# Patient Record
Sex: MALE | Race: WHITE | NOT HISPANIC OR LATINO | Employment: FULL TIME | URBAN - METROPOLITAN AREA
[De-identification: names, ages, dates, MRNs, and addresses within clinical notes are randomized per-mention and may not be internally consistent; named-entity substitution may affect disease eponyms.]

---

## 2021-11-13 ENCOUNTER — HOSPITAL ENCOUNTER (EMERGENCY)
Facility: HOSPITAL | Age: 36
Discharge: HOME | End: 2021-11-13
Attending: SURGERY
Payer: COMMERCIAL

## 2021-11-13 VITALS
DIASTOLIC BLOOD PRESSURE: 91 MMHG | SYSTOLIC BLOOD PRESSURE: 139 MMHG | RESPIRATION RATE: 18 BRPM | HEART RATE: 72 BPM | TEMPERATURE: 98 F | OXYGEN SATURATION: 98 %

## 2021-11-13 DIAGNOSIS — M10.9 GOUT: ICD-10-CM

## 2021-11-13 DIAGNOSIS — M25.572 LEFT ANKLE PAIN: ICD-10-CM

## 2021-11-13 LAB
ALBUMIN SERPL BCP-MCNC: 4.2 G/DL (ref 3.5–5.2)
ALP SERPL-CCNC: 68 U/L (ref 55–135)
ALT SERPL W/O P-5'-P-CCNC: 30 U/L (ref 10–44)
ANION GAP SERPL CALC-SCNC: 12 MMOL/L (ref 8–16)
AST SERPL-CCNC: 19 U/L (ref 10–40)
BASOPHILS # BLD AUTO: 0.03 K/UL (ref 0–0.2)
BASOPHILS NFR BLD: 0.3 % (ref 0–1.9)
BILIRUB SERPL-MCNC: 2.1 MG/DL (ref 0.1–1)
BUN SERPL-MCNC: 15 MG/DL (ref 6–20)
CALCIUM SERPL-MCNC: 10 MG/DL (ref 8.7–10.5)
CHLORIDE SERPL-SCNC: 101 MMOL/L (ref 95–110)
CO2 SERPL-SCNC: 28 MMOL/L (ref 23–29)
CREAT SERPL-MCNC: 0.9 MG/DL (ref 0.5–1.4)
DIFFERENTIAL METHOD: ABNORMAL
EOSINOPHIL # BLD AUTO: 0.2 K/UL (ref 0–0.5)
EOSINOPHIL NFR BLD: 2.4 % (ref 0–8)
ERYTHROCYTE [DISTWIDTH] IN BLOOD BY AUTOMATED COUNT: 11.9 % (ref 11.5–14.5)
EST. GFR  (AFRICAN AMERICAN): >60 ML/MIN/1.73 M^2
EST. GFR  (NON AFRICAN AMERICAN): >60 ML/MIN/1.73 M^2
GLUCOSE SERPL-MCNC: 90 MG/DL (ref 70–110)
HCT VFR BLD AUTO: 45.5 % (ref 40–54)
HGB BLD-MCNC: 15.7 G/DL (ref 14–18)
IMM GRANULOCYTES # BLD AUTO: 0.06 K/UL (ref 0–0.04)
IMM GRANULOCYTES NFR BLD AUTO: 0.6 % (ref 0–0.5)
LYMPHOCYTES # BLD AUTO: 2.3 K/UL (ref 1–4.8)
LYMPHOCYTES NFR BLD: 24.7 % (ref 18–48)
MCH RBC QN AUTO: 29.9 PG (ref 27–31)
MCHC RBC AUTO-ENTMCNC: 34.5 G/DL (ref 32–36)
MCV RBC AUTO: 87 FL (ref 82–98)
MONOCYTES # BLD AUTO: 1 K/UL (ref 0.3–1)
MONOCYTES NFR BLD: 11.1 % (ref 4–15)
NEUTROPHILS # BLD AUTO: 5.7 K/UL (ref 1.8–7.7)
NEUTROPHILS NFR BLD: 60.9 % (ref 38–73)
NRBC BLD-RTO: 0 /100 WBC
PLATELET # BLD AUTO: 291 K/UL (ref 150–450)
PMV BLD AUTO: 10 FL (ref 9.2–12.9)
POTASSIUM SERPL-SCNC: 4.3 MMOL/L (ref 3.5–5.1)
PROT SERPL-MCNC: 7.8 G/DL (ref 6–8.4)
RBC # BLD AUTO: 5.25 M/UL (ref 4.6–6.2)
SODIUM SERPL-SCNC: 141 MMOL/L (ref 136–145)
URATE SERPL-MCNC: 8.8 MG/DL (ref 3.4–7)
WBC # BLD AUTO: 9.4 K/UL (ref 3.9–12.7)

## 2021-11-13 PROCEDURE — 84550 ASSAY OF BLOOD/URIC ACID: CPT | Performed by: SURGERY

## 2021-11-13 PROCEDURE — 63600175 PHARM REV CODE 636 W HCPCS: Performed by: SURGERY

## 2021-11-13 PROCEDURE — 96372 THER/PROPH/DIAG INJ SC/IM: CPT

## 2021-11-13 PROCEDURE — 99284 EMERGENCY DEPT VISIT MOD MDM: CPT | Mod: 25

## 2021-11-13 PROCEDURE — 80053 COMPREHEN METABOLIC PANEL: CPT | Performed by: SURGERY

## 2021-11-13 PROCEDURE — 85025 COMPLETE CBC W/AUTO DIFF WBC: CPT | Performed by: SURGERY

## 2021-11-13 PROCEDURE — 36415 COLL VENOUS BLD VENIPUNCTURE: CPT | Performed by: SURGERY

## 2021-11-13 RX ORDER — KETOROLAC TROMETHAMINE 30 MG/ML
60 INJECTION, SOLUTION INTRAMUSCULAR; INTRAVENOUS
Status: COMPLETED | OUTPATIENT
Start: 2021-11-13 | End: 2021-11-13

## 2021-11-13 RX ORDER — AZILSARTAN KAMEDOXOMIL AND CHLORTHALIDONE 40; 12.5 MG/1; MG/1
0.5 TABLET ORAL DAILY
COMMUNITY
Start: 2021-11-03

## 2021-11-13 RX ORDER — INDOMETHACIN 50 MG/1
50 CAPSULE ORAL
Qty: 15 CAPSULE | Refills: 0 | Status: SHIPPED | OUTPATIENT
Start: 2021-11-13

## 2021-11-13 RX ORDER — TESTOSTERONE CYPIONATE 200 MG/ML
100 INJECTION, SOLUTION INTRAMUSCULAR WEEKLY
COMMUNITY
Start: 2021-11-10

## 2021-11-13 RX ORDER — METHYLPREDNISOLONE 4 MG/1
TABLET ORAL
Qty: 1 EACH | Refills: 0 | Status: SHIPPED | OUTPATIENT
Start: 2021-11-13

## 2021-11-13 RX ORDER — METHYLPREDNISOLONE SOD SUCC 125 MG
125 VIAL (EA) INJECTION
Status: COMPLETED | OUTPATIENT
Start: 2021-11-13 | End: 2021-11-13

## 2021-11-13 RX ORDER — COLCHICINE 0.6 MG/1
0.6 CAPSULE ORAL
COMMUNITY

## 2021-11-13 RX ADMIN — KETOROLAC TROMETHAMINE 60 MG: 30 INJECTION, SOLUTION INTRAMUSCULAR at 04:11

## 2021-11-13 RX ADMIN — METHYLPREDNISOLONE SODIUM SUCCINATE 125 MG: 125 INJECTION, POWDER, FOR SOLUTION INTRAMUSCULAR; INTRAVENOUS at 04:11

## 2023-01-05 ENCOUNTER — HOSPITAL ENCOUNTER (EMERGENCY)
Facility: HOSPITAL | Age: 38
Discharge: HOME | End: 2023-01-05
Attending: SURGERY
Payer: COMMERCIAL

## 2023-01-05 VITALS
OXYGEN SATURATION: 97 % | TEMPERATURE: 98 F | RESPIRATION RATE: 18 BRPM | HEART RATE: 71 BPM | WEIGHT: 278.75 LBS | SYSTOLIC BLOOD PRESSURE: 134 MMHG | DIASTOLIC BLOOD PRESSURE: 84 MMHG

## 2023-01-05 DIAGNOSIS — K64.9 HEMORRHOIDS, UNSPECIFIED HEMORRHOID TYPE: Primary | ICD-10-CM

## 2023-01-05 PROCEDURE — 99282 EMERGENCY DEPT VISIT SF MDM: CPT

## 2023-01-05 RX ORDER — DOCUSATE SODIUM 100 MG/1
100 CAPSULE, LIQUID FILLED ORAL 2 TIMES DAILY PRN
Qty: 30 CAPSULE | Refills: 0 | Status: SHIPPED | OUTPATIENT
Start: 2023-01-05

## 2023-01-05 RX ORDER — HYDROCORTISONE 25 MG/G
CREAM TOPICAL 2 TIMES DAILY
Qty: 30 G | Refills: 2 | Status: SHIPPED | OUTPATIENT
Start: 2023-01-05

## 2023-01-05 NOTE — ED PROVIDER NOTES
Encounter Date: 1/5/2023       History     Chief Complaint   Patient presents with    Rectal Bleeding     Pt c/c of bleeding hemorrhoids after having a BM this morning.  Pt denies pain, reports having trouble controlling the bleeding at home.       37-year-old male presents with bleeding from the rectal area this morning  Patient on exam has a bleeding external hemorrhoid, was thrombosed  Apparently opened up with expulsion of the thrombus clots this morning  Patient does have a history of hemorrhoids, was straining this morning  Mild bleeding with no active signs of arterial brisk bleeding on evaluation    Review of patient's allergies indicates:   Allergen Reactions    Pcn [penicillins] Swelling    Lactose Diarrhea     Past Medical History:   Diagnosis Date    Hypertension      Past Surgical History:   Procedure Laterality Date    APPENDECTOMY      KNEE ARTHROSCOPY      TONSILLECTOMY       No family history on file.  Social History     Tobacco Use    Smoking status: Never    Smokeless tobacco: Never   Substance Use Topics    Alcohol use: Yes     Comment: occassional     Review of Systems   Gastrointestinal:  Positive for anal bleeding.     Physical Exam     Initial Vitals [01/05/23 0257]   BP Pulse Resp Temp SpO2   134/84 71 18 97.8 °F (36.6 °C) 97 %      MAP       --         Physical Exam    Nursing note and vitals reviewed.  Constitutional: Vital signs are normal. He appears well-developed and well-nourished. He is cooperative.   HENT:   Head: Normocephalic and atraumatic.   Right Ear: Hearing, tympanic membrane, external ear and ear canal normal.   Left Ear: Hearing, tympanic membrane, external ear and ear canal normal.   Nose: Nose normal.   Mouth/Throat: Uvula is midline, oropharynx is clear and moist and mucous membranes are normal.   Eyes: Conjunctivae, EOM and lids are normal. Pupils are equal, round, and reactive to light.   Neck: Neck supple. No JVD present.   Normal range of motion.   Full passive range  of motion without pain.     Cardiovascular:  Normal rate, regular rhythm, S1 normal, S2 normal, normal heart sounds, intact distal pulses and normal pulses.           Pulmonary/Chest: Effort normal and breath sounds normal.   Abdominal: Abdomen is soft and flat. Bowel sounds are normal.   Genitourinary:    Genitourinary Comments: External hemorrhoid with bleeding     Musculoskeletal:         General: Normal range of motion.      Cervical back: Full passive range of motion without pain, normal range of motion and neck supple.     Neurological: He is alert and oriented to person, place, and time. He has normal strength.   Skin: Skin is warm, dry and intact. Capillary refill takes less than 2 seconds.       ED Course   Procedures  Labs Reviewed - No data to display       Imaging Results    None          Medications - No data to display    Medical Decision Makin-year-old female with external hemorrhoid bleeding in the ER today  Was thrombosed & ruptured, mild bleeding on ER evaluation today  Will prescribe Anusol any stool softener with colorectal clinic follow-up  Return to the ER with any concerning symptoms after discharge                        Clinical Impression:   Final diagnoses:  [K64.9] Hemorrhoids, unspecified hemorrhoid type (Primary)        ED Disposition Condition    Discharge Stable          ED Prescriptions       Medication Sig Dispense Start Date End Date Auth. Provider    docusate sodium (COLACE) 100 MG capsule Take 1 capsule (100 mg total) by mouth 2 (two) times daily as needed for Constipation. 30 capsule 2023 -- John Greenberg MD    hydrocortisone 2.5 % cream Apply topically 2 (two) times daily. 30 g 2023 -- John Greenberg MD          Follow-up Information       Follow up With Specialties Details Why Contact Info    Nick Arora MD Colon and Rectal Surgery Go in 2 days  [address] [address]  [phone]               John Greenberg MD  [phone]

## 2023-01-05 NOTE — Clinical Note
"Olga BAHENA" Vee was seen and treated in our emergency department on 1/5/2023.  He may return to work on 01/09/2023.       If you have any questions or concerns, please don't hesitate to call.      John Greenberg MD"

## 2023-01-09 ENCOUNTER — OFFICE VISIT (OUTPATIENT)
Dept: SURGERY | Facility: CLINIC | Age: 38
End: 2023-01-09
Payer: COMMERCIAL

## 2023-01-09 VITALS
HEART RATE: 71 BPM | DIASTOLIC BLOOD PRESSURE: 84 MMHG | WEIGHT: 276.81 LBS | BODY MASS INDEX: 32.68 KG/M2 | SYSTOLIC BLOOD PRESSURE: 134 MMHG | HEIGHT: 77 IN

## 2023-01-09 DIAGNOSIS — K64.5 THROMBOSED HEMORRHOIDS: Primary | ICD-10-CM

## 2023-01-09 PROCEDURE — 3008F BODY MASS INDEX DOCD: CPT | Mod: CPTII,S$GLB,, | Performed by: NURSE PRACTITIONER

## 2023-01-09 PROCEDURE — 99999 PR PBB SHADOW E&M-EST. PATIENT-LVL IV: CPT | Mod: PBBFAC,,, | Performed by: NURSE PRACTITIONER

## 2023-01-09 PROCEDURE — 1160F RVW MEDS BY RX/DR IN RCRD: CPT | Mod: CPTII,S$GLB,, | Performed by: NURSE PRACTITIONER

## 2023-01-09 PROCEDURE — 99999 PR PBB SHADOW E&M-EST. PATIENT-LVL IV: ICD-10-PCS | Mod: PBBFAC,,, | Performed by: NURSE PRACTITIONER

## 2023-01-09 PROCEDURE — 1159F PR MEDICATION LIST DOCUMENTED IN MEDICAL RECORD: ICD-10-PCS | Mod: CPTII,S$GLB,, | Performed by: NURSE PRACTITIONER

## 2023-01-09 PROCEDURE — 1159F MED LIST DOCD IN RCRD: CPT | Mod: CPTII,S$GLB,, | Performed by: NURSE PRACTITIONER

## 2023-01-09 PROCEDURE — 4010F PR ACE/ARB THEARPY RXD/TAKEN: ICD-10-PCS | Mod: CPTII,S$GLB,, | Performed by: NURSE PRACTITIONER

## 2023-01-09 PROCEDURE — 99203 OFFICE O/P NEW LOW 30 MIN: CPT | Mod: S$GLB,,, | Performed by: NURSE PRACTITIONER

## 2023-01-09 PROCEDURE — 3075F PR MOST RECENT SYSTOLIC BLOOD PRESS GE 130-139MM HG: ICD-10-PCS | Mod: CPTII,S$GLB,, | Performed by: NURSE PRACTITIONER

## 2023-01-09 PROCEDURE — 1160F PR REVIEW ALL MEDS BY PRESCRIBER/CLIN PHARMACIST DOCUMENTED: ICD-10-PCS | Mod: CPTII,S$GLB,, | Performed by: NURSE PRACTITIONER

## 2023-01-09 PROCEDURE — 3075F SYST BP GE 130 - 139MM HG: CPT | Mod: CPTII,S$GLB,, | Performed by: NURSE PRACTITIONER

## 2023-01-09 PROCEDURE — 99203 PR OFFICE/OUTPT VISIT, NEW, LEVL III, 30-44 MIN: ICD-10-PCS | Mod: S$GLB,,, | Performed by: NURSE PRACTITIONER

## 2023-01-09 PROCEDURE — 4010F ACE/ARB THERAPY RXD/TAKEN: CPT | Mod: CPTII,S$GLB,, | Performed by: NURSE PRACTITIONER

## 2023-01-09 PROCEDURE — 3008F PR BODY MASS INDEX (BMI) DOCUMENTED: ICD-10-PCS | Mod: CPTII,S$GLB,, | Performed by: NURSE PRACTITIONER

## 2023-01-09 PROCEDURE — 3079F DIAST BP 80-89 MM HG: CPT | Mod: CPTII,S$GLB,, | Performed by: NURSE PRACTITIONER

## 2023-01-09 PROCEDURE — 3079F PR MOST RECENT DIASTOLIC BLOOD PRESSURE 80-89 MM HG: ICD-10-PCS | Mod: CPTII,S$GLB,, | Performed by: NURSE PRACTITIONER

## 2023-01-09 RX ORDER — HYDROCORTISONE 25 MG/G
CREAM TOPICAL 2 TIMES DAILY
Qty: 28 G | Refills: 2 | Status: SHIPPED | OUTPATIENT
Start: 2023-01-09

## 2023-01-09 NOTE — LETTER
January 9, 2023      Viraj Pickardliang  [facility] [address]  [address]  [facility] [address]  Phone: [phone]       Patient: Olga Baxter   YOB: 1985  Date of Visit: 01/09/2023    To Whom It May Concern:    Rufus Baxter  was at [facility] on 01/05/2023 and 01/09/2023. Please excuse from work from 01/05/23-01/08/23. The patient may return to work/school on 01/09/23 with no restrictions. If you have any questions or concerns, or if I can be of further assistance, please do not hesitate to contact me.    Sincerely,      Maureen Salazar NP

## 2023-01-09 NOTE — PROGRESS NOTES
"CRS Office Visit History and Physical    Referring Md:   John Greenberg Md  [address] [place] [address]    SUBJECTIVE:     Chief Complaint: thrombosed hemorrhoid    History of Present Illness:  The patient is new patient to this practice.   Course is as follows:  Patient is a 37 y.o. male presents with thrombosed hemorrhoid. Seen in ED on 1/5/23 for same with recs of not returning to work before being seen in CRS clinic.  Symptoms have been present for 5 days.  Has tried anusol cream. Bleeding has improved, though still present  Associated bleeding: yes  Previous anorectal procedures: no  denies straining/prolonged time on toilet with bowel movements.  is not currently taking fiber supplement or stool softener. Having 3-4 loose stools per day. D/t lactose intolerance per pt report  Blood thinners: No      Review of patient's allergies indicates:   Allergen Reactions    Pcn [penicillins] Swelling    Lactose Diarrhea       Past Medical History:   Diagnosis Date    Hypertension      Past Surgical History:   Procedure Laterality Date    APPENDECTOMY      KNEE ARTHROSCOPY      TONSILLECTOMY       History reviewed. No pertinent family history.  Social History     Tobacco Use    Smoking status: Never    Smokeless tobacco: Never   Substance Use Topics    Alcohol use: Yes     Comment: occassional        Review of Systems:  Review of Systems   Gastrointestinal:  Positive for blood in stool and diarrhea.        Anal pain     OBJECTIVE:     Vital Signs (Most Recent)  Blood Pressure 134/84 (BP Location: Left arm, Patient Position: Sitting, BP Method: Large (Automatic))   Pulse 71   Height 6' 4.5" (1.943 m)   Weight 125.6 kg (276 lb 12.8 oz)   Body Mass Index 33.25 kg/m²     Physical Exam:  General: White male in no distress   Neuro: Alert and oriented to person, place, and time.  Moves all extremities.     HEENT: No icterus.  Trachea midline  Respiratory: Respirations are even and unlabored, no cough or audible " wheezing  Skin: Warm dry and intact, No visible rashes, no jaundice    Labs reviewed today:  Lab Results   Component Value Date    WBC 9.40 11/13/2021    HGB 15.7 11/13/2021    HCT 45.5 11/13/2021     11/13/2021    ALT 30 11/13/2021    AST 19 11/13/2021     11/13/2021    K 4.3 11/13/2021     11/13/2021    CREATININE 0.9 11/13/2021    BUN 15 11/13/2021    CO2 28 11/13/2021       Imaging reviewed today:  none    Endoscopy reviewed today:  none    Anorectal Exam:    Anal Skin:  midline posterior hypertrophied anal papilla; right lateral thrombosed hemorrhoid  - soft, non ttp  - resolving    Digital Rectal Exam:  deferred      ASSESSMENT/PLAN:     Olga BAHENA was seen today for hemorrhoids and rectal bleeding.    Diagnoses and all orders for this visit:    Thrombosed hemorrhoids    Other orders  -     hydrocortisone (ANUSOL-HC) 2.5 % rectal cream; Place rectally 2 (two) times daily.        The patient was instructed to  May return to work today.   Continue anusol bid for 2 weeks internally and externally  Increase water intake to at least 8-10 glasses of water per day.  Take a daily fiber supplement (Konsyl, Benefiber, Metamucil) and increase dietary intake to 20-30 grams/day.  Avoid straining or spending >5min/event with bowel movements.  Follow-up in clinic prn      DENVER Prajapati  Colon and Rectal Surgery

## 2023-01-09 NOTE — PATIENT INSTRUCTIONS
Anusol cream internally/externally 1-2x/day for 2 weeks.  Daily fiber supplement. Metamucil, Citrucel, Fibercon.  Water intake > 64oz/day.  Call/message asap if this happens again.